# Patient Record
Sex: FEMALE | Race: WHITE
[De-identification: names, ages, dates, MRNs, and addresses within clinical notes are randomized per-mention and may not be internally consistent; named-entity substitution may affect disease eponyms.]

---
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
## 2025-07-28 ENCOUNTER — HOSPITAL ENCOUNTER (OUTPATIENT)
Dept: HOSPITAL 47 - RADXRYALE | Age: 20
Discharge: HOME | End: 2025-07-28
Attending: PHYSICIAN ASSISTANT
Payer: COMMERCIAL

## 2025-07-28 DIAGNOSIS — R14.0: ICD-10-CM

## 2025-07-28 DIAGNOSIS — R31.9: ICD-10-CM

## 2025-07-28 DIAGNOSIS — R10.814: Primary | ICD-10-CM

## 2025-07-28 PROCEDURE — 74018 RADEX ABDOMEN 1 VIEW: CPT
